# Patient Record
Sex: MALE | URBAN - METROPOLITAN AREA
[De-identification: names, ages, dates, MRNs, and addresses within clinical notes are randomized per-mention and may not be internally consistent; named-entity substitution may affect disease eponyms.]

---

## 2018-02-12 ENCOUNTER — THERAPY VISIT (OUTPATIENT)
Dept: PHYSICAL THERAPY | Facility: CLINIC | Age: 63
End: 2018-02-12
Payer: COMMERCIAL

## 2018-02-12 DIAGNOSIS — M62.81 GENERALIZED MUSCLE WEAKNESS: Primary | ICD-10-CM

## 2018-02-12 PROCEDURE — 97110 THERAPEUTIC EXERCISES: CPT | Mod: GP | Performed by: PHYSICAL THERAPIST

## 2018-02-12 PROCEDURE — 97162 PT EVAL MOD COMPLEX 30 MIN: CPT | Mod: GP | Performed by: PHYSICAL THERAPIST

## 2018-02-12 ASSESSMENT — ACTIVITIES OF DAILY LIVING (ADL)
STIFFNESS: NOT ANSWERED
STAND: ACTIVITY IS VERY DIFFICULT
SIT WITH YOUR KNEE BENT: ACTIVITY IS NOT DIFFICULT
SWELLING: THE SYMPTOM AFFECTS MY ACTIVITY MODERATELY
WALK: ACTIVITY IS FAIRLY DIFFICULT
RISE FROM A CHAIR: ACTIVITY IS VERY DIFFICULT
AS_A_RESULT_OF_YOUR_KNEE_INJURY,_HOW_WOULD_YOU_RATE_YOUR_CURRENT_LEVEL_OF_DAILY_ACTIVITY?: SEVERELY ABNORMAL
GO UP STAIRS: ACTIVITY IS VERY DIFFICULT
KNEEL ON THE FRONT OF YOUR KNEE: I AM UNABLE TO DO THE ACTIVITY
PAIN: NOT ANSWERED
SQUAT: I AM UNABLE TO DO THE ACTIVITY
GO DOWN STAIRS: ACTIVITY IS VERY DIFFICULT
HOW_WOULD_YOU_RATE_THE_OVERALL_FUNCTION_OF_YOUR_KNEE_DURING_YOUR_USUAL_DAILY_ACTIVITIES?: ABNORMAL
GIVING WAY, BUCKLING OR SHIFTING OF KNEE: THE SYMPTOM AFFECTS MY ACTIVITY MODERATELY
HOW_WOULD_YOU_RATE_THE_CURRENT_FUNCTION_OF_YOUR_KNEE_DURING_YOUR_USUAL_DAILY_ACTIVITIES_ON_A_SCALE_FROM_0_TO_100_WITH_100_BEING_YOUR_LEVEL_OF_KNEE_FUNCTION_PRIOR_TO_YOUR_INJURY_AND_0_BEING_THE_INABILITY_TO_PERFORM_ANY_OF_YOUR_USUAL_DAILY_ACTIVITIES?: 30
WEAKNESS: THE SYMPTOM AFFECTS MY ACTIVITY MODERATELY
KNEE_ACTIVITY_OF_DAILY_LIVING_SUM: 22
LIMPING: I DO NOT HAVE THE SYMPTOM

## 2018-02-12 NOTE — MR AVS SNAPSHOT
"              After Visit Summary   2018    Dank Larkin    MRN: [id number]           Patient Information     Date Of Birth          1955        Visit Information        Provider Department      2018 2:50 PM Rach Sawant PT [facility]        Today's Diagnoses     Generalized muscle weakness    -  1       Follow-ups after your visit        Who to contact     If you have questions or need follow up information about today's clinic visit or your schedule please contact [facility] PHYSICAL [facility] directly at [phone].  Normal or non-critical lab and imaging results will be communicated to you by [employer], letter or phone within 4 business days after the clinic has received the results. If you do not hear from us within 7 days, please contact the clinic through [employer] or phone. If you have a critical or abnormal lab result, we will notify you by phone as soon as possible.  Submit refill requests through [employer] or call your pharmacy and they will forward the refill request to us. Please allow 3 business days for your refill to be completed.          Additional Information About Your Visit        MyChart Information     [employer] lets you send messages to your doctor, view your test results, renew your prescriptions, schedule appointments and more. To sign up, go to [email or web address] . Click on \"Log in\" on the left side of the screen, which will take you to the Welcome page. Then click on \"Sign up Now\" on the right side of the page.     You will be asked to enter the access code listed below, as well as some personal information. Please follow the directions to create your username and password.     Your access code is: [email or web address]  Expires: 2018 11:16 PM     Your access code will  in 90 days. If you need help or a new code, please call your [place] clinic or [phone].        Care " EveryWhere ID     This is your Care EveryWhere ID. This could be used by other organizations to access your [facility] medical records  [id number]         Blood Pressure from Last 3 Encounters:   No data found for BP    Weight from Last 3 Encounters:   No data found for Wt              We Performed the Following     NISH Inital Eval Report     PT Eval, Moderate Complexity ([id number])     THERAPEUTIC EXERCISES        Primary Care Provider Fax #    [facility] [phone] [address] [address]        Equal Access to Services     YFN ROBBINS : Hadii aad ku hadasho Soomaali, waaxda luqadaha, qaybta kaalmada adeegyada, waxay idiin hayaan adeeg wingaraaustin laluis enrique . So [facility] [phone].    ATENCIÓN: Si habla español, tiene a ricketts disposición servicios gratuitos de asistencia lingüística. Llame al [phone].    We comply with applicable federal civil rights laws and [place] laws. We do not discriminate on the basis of race, color, national origin, age, disability, sex, sexual orientation, or gender identity.            Thank you!     Thank you for choosing [facility]  for your care. Our goal is always to provide you with excellent care. Hearing back from our patients is one way we can continue to improve our services. Please take a few minutes to complete the written survey that you may receive in the mail after your visit with us. Thank you!             Your Updated Medication List - Protect others around you: Learn how to safely use, store and throw away your medicines at www.disposemymeds.org.      Notice  As of 2/12/2018 11:16 PM    You have not been prescribed any medications.

## 2018-02-12 NOTE — LETTER
[facility] PHYSICAL THERAPY  [address]  [place] [address]  [phone]    2018    Re: Dank Larkin   :   1955  MRN:  [id number]   REFERRING PHYSICIAN:   Christopher Chau    [facility]    Date of Initial Evaluation:  2018  Visits:  Rxs Used: 1  Reason for Referral:  Generalized muscle weakness    EVALUATION SUMMARY    Subjective:  Patient is a 62 year old male presenting with rehab left knee hpi.   Dank Larkin is a 62 year old male with a bilateral knees condition.  Condition occurred with:  Other reason (recent hospital stays led to deconditioning).  Condition occurred: other (hospital stays and recent worsening of CHF ).  This is a new condition  2017 w/ hospitalization for 8 days, then out 10 days, then back in hospital for another 7 days.  Has had 2 falls, and multiple near falls (knee buckling/dizziness/trip over items).    Patient reports pain:  Anterior (anterior patellar region, bilaterally R>L).    Pain is described as aching and is intermittent and reported as 2/10 (no pain. At worst: 2/10 in knees).  Associated symptoms:  Buckling/giving out, loss of strength, tingling and edema (R>L LE tingling worse depending on water retention- CHF. Falls (1 today, 1 last week)d/t dizzy, knee buckling, instability, tripped. swelling into feet). Pain is the same all the time.  Symptoms are exacerbated by weight bearing, activity, standing, bending/squatting, walking, ascending stairs, descending stairs and transfers and relieved by rest.  Since onset symptoms are gradually worsening.    Previous treatment: cardiac rehab currently for CHF.  Improvement with previous treatment: cardiac rehab started last week- 3x/week MWF for 1 hour.  General health as reported by patient is poor.  Pertinent medical history includes:  High blood pressure, sleep disorder/apnea,  implanted device, heart problems, depression and diabetes (CHF- currently in cardiac rehab 3x/week. Pre-diabetes.  DEFIBRILLATOR.  Panic attacks due to CHF).  Medical allergies: no.  Other surgeries include:  Heart surgery.  Current medications:  Cardiac medication.  Current occupation is Not working- disabled.    Primary job tasks include:  Prolonged sitting and other (computer work).  Barriers include:  Transportation.  Red flags:  Significant weakness (dizziness (heart vs changing of medications?)).       Objective:  Gait:    Assistive Devices:  Walker    Ankle/Foot Evaluation  ROM:  AROM is normal.    Re: Dank Larkin   :   1955    Strength:    Dorsiflexion:  Left: 4-/5     Pain:   Right: 4/5   Pain:  EDEMA:   Left ankle edema present at: general  Right ankle edema present at:  general              Hip Evaluation  Hip Strength:    Flexion:   Left: 3+/5   Pain:  Right: 3+/5   Pain:                  Abduction:  Left: 4+/5     Pain:Right: 4+/5    Pain:  Adduction:  Left: 4+/5    Pain:Right: 4+/5   Pain:  Functional Testing:    Proprioception:  Stork balance test:   Left:    Able to balance w/ unilateral UE support  Right:  Able to balance w/ unilateral UE support  % of Uninvolved:        Knee Evaluation:  ROM:    Strength:   Extension:  Left: 4/5   Pain:      Right: 4/5   Pain:  Flexion:  Left: 4/5   Pain:      Right: 4+/5   Pain:    Quad Set Left: Good    Pain:   Quad Set Right: Good    Pain:    Assessment/Plan:    Patient is a 62 year old male with both sides knee complaints.    Patient has the following significant findings with corresponding treatment plan.                Diagnosis 1:  B LE weakness secondary to multiple recent hospitalizations and progression of CHF.   Pain -  hot/cold therapy, manual therapy, education and home program  Decreased strength - therapeutic exercise, therapeutic activities and home program  Impaired balance - neuro re-education, gait training, therapeutic activities,  adaptive equipment/assistive device and home program  Impaired gait - gait training, assistive devices and home program  Impaired muscle performance - neuro re-education and home program  Decreased function - therapeutic activities and home program  Impaired posture - neuro re-education and home program    Therapy Evaluation Codes:   1) History comprised of:   Personal factors that impact the plan of care:      Cognition.    Comorbidity factors that impact the plan of care are:      Heart problems, High blood pressure, Implanted device and Weakness.     Medications impacting care: Cardiac.  2) Examination of Body Systems comprised of:   Body structures and functions that impact the plan of care:      Hip and Knee.  Re: Dank Larkin   :   1955     Activity limitations that impact the plan of care are:      Bathing, Bending, Cooking, Driving, Dressing, Lifting, Squatting/kneeling, Stairs, Standing and Walking.  3) Clinical presentation characteristics are:   Evolving/Changing.  4) Decision-Making    Moderate complexity using standardized patient assessment instrument and/or measureable assessment of functional outcome.  Cumulative Therapy Evaluation is: Moderate complexity.    Previous and current functional limitations:  (See Goal Flow Sheet for this information)    Short term and Long term goals: (See Goal Flow Sheet for this information)     Communication ability:  Patient appears to be able to clearly communicate and understand verbal and written communication and follow directions correctly.  Treatment Explanation - The following has been discussed with the patient:   RX ordered/plan of care  Anticipated outcomes  Possible risks and side effects  This patient would benefit from PT intervention to resume normal activities.   Rehab potential is good.    Frequency:  2 X week, once daily  Duration:  for 4 weeks tapering to 1x/week x 1 week.  Discharge Plan:  Achieve all LTG.  Independent in home treatment  program.  Reach maximal therapeutic benefit.    Thank you for your referral.    INQUIRIES  Therapist: Rach Sawant DPT  [facility]  [address] [address]  Phone: [phone]  Fax: [phone]

## 2018-02-12 NOTE — PROGRESS NOTES
[facility] Initial Evaluation  Subjective:  Patient is a 62 year old male presenting with rehab left knee hpi.   Dank Larkin is a 62 year old male with a bilateral knees condition.  Condition occurred with:  Other reason (recent hospital stays led to deconditioning).  Condition occurred: other (hospital stays and recent worsening of CHF  ).  This is a new condition  December 2017 w/ hospitalization for 8 days, then out 10 days, then back in hospital for another 7 days.  Has had 2 falls, and multiple near falls (knee buckling/dizziness/trip over items)..    Patient reports pain:  Anterior (anterior patellar region, bilaterally R>L).    Pain is described as aching and is intermittent and reported as 2/10 (no pain. At worst: 2/10 in knees).  Associated symptoms:  Buckling/giving out, loss of strength, tingling and edema (R>L LE tingling worse depending on water retention- CHF. Falls (1 today, 1 last week)d/t dizzy, knee buckling, instability, tripped. swelling into feet). Pain is the same all the time.  Symptoms are exacerbated by weight bearing, activity, standing, bending/squatting, walking, ascending stairs, descending stairs and transfers and relieved by rest.  Since onset symptoms are gradually worsening.    Previous treatment: cardiac rehab currently for CHF.  Improvement with previous treatment: cardiac rehab started last week- 3x/week MWF for 1 hour.  General health as reported by patient is poor.  Pertinent medical history includes:  High blood pressure, sleep disorder/apnea, implanted device, heart problems, depression and diabetes (CHF- currently in cardiac rehab 3x/week. Pre-diabetes.  DEFIBRILLATOR.  Panic attacks due to CHF).  Medical allergies: no.  Other surgeries include:  Heart surgery.  Current medications:  Cardiac medication.  Current occupation is Not working- disabled.    Primary job tasks include:  Prolonged sitting and other (computer work).    Barriers include:   Transportation.    Red flags:  Significant weakness (dizziness (heart vs changing of medications?)).                        Objective:    Gait:    Assistive Devices:  Walker            Ankle/Foot Evaluation  ROM:  AROM is normal.      Strength:    Dorsiflexion:  Left: 4-/5     Pain:   Right: 4/5   Pain:                              EDEMA:   Left ankle edema present at: general  Right ankle edema present at:  general                                                 Hip Evaluation    Hip Strength:    Flexion:   Left: 3+/5   Pain:  Right: 3+/5   Pain:                      Abduction:  Left: 4+/5     Pain:Right: 4+/5    Pain:  Adduction:  Left: 4+/5    Pain:Right: 4+/5   Pain:                    Functional Testing:            Proprioception:    Stork balance test:   Left:    Able to balance w/ unilateral UE support  Right:  Able to balance w/ unilateral UE support  % of Uninvolved:          Knee Evaluation:  ROM:            Strength:     Extension:  Left: 4/5   Pain:      Right: 4/5   Pain:  Flexion:  Left: 4/5   Pain:      Right: 4+/5   Pain:    Quad Set Left: Good    Pain:   Quad Set Right: Good    Pain:                  General     ROS    Assessment/Plan:    Patient is a 62 year old male with both sides knee complaints.    Patient has the following significant findings with corresponding treatment plan.                Diagnosis 1:  B LE weakness secondary to multiple recent hospitalizations and progression of CHF.   Pain -  hot/cold therapy, manual therapy, education and home program  Decreased strength - therapeutic exercise, therapeutic activities and home program  Impaired balance - neuro re-education, gait training, therapeutic activities, adaptive equipment/assistive device and home program  Impaired gait - gait training, assistive devices and home program  Impaired muscle performance - neuro re-education and home program  Decreased function - therapeutic activities and home program  Impaired posture - neuro  re-education and home program    Therapy Evaluation Codes:   1) History comprised of:   Personal factors that impact the plan of care:      Cognition.    Comorbidity factors that impact the plan of care are:      Heart problems, High blood pressure, Implanted device and Weakness.     Medications impacting care: Cardiac.  2) Examination of Body Systems comprised of:   Body structures and functions that impact the plan of care:      Hip and Knee.   Activity limitations that impact the plan of care are:      Bathing, Bending, Cooking, Driving, Dressing, Lifting, Squatting/kneeling, Stairs, Standing and Walking.  3) Clinical presentation characteristics are:   Evolving/Changing.  4) Decision-Making    Moderate complexity using standardized patient assessment instrument and/or measureable assessment of functional outcome.  Cumulative Therapy Evaluation is: Moderate complexity.    Previous and current functional limitations:  (See Goal Flow Sheet for this information)    Short term and Long term goals: (See Goal Flow Sheet for this information)     Communication ability:  Patient appears to be able to clearly communicate and understand verbal and written communication and follow directions correctly.  Treatment Explanation - The following has been discussed with the patient:   RX ordered/plan of care  Anticipated outcomes  Possible risks and side effects  This patient would benefit from PT intervention to resume normal activities.   Rehab potential is good.    Frequency:  2 X week, once daily  Duration:  for 4 weeks tapering to 1x/week x 1 week.  Discharge Plan:  Achieve all LTG.  Independent in home treatment program.  Reach maximal therapeutic benefit.    Please refer to the daily flowsheet for treatment today, total treatment time and time spent performing 1:1 timed codes.

## 2018-04-17 PROBLEM — M62.81 GENERALIZED MUSCLE WEAKNESS: Status: RESOLVED | Noted: 2018-02-12 | Resolved: 2018-04-17
